# Patient Record
Sex: MALE | NOT HISPANIC OR LATINO | URBAN - METROPOLITAN AREA
[De-identification: names, ages, dates, MRNs, and addresses within clinical notes are randomized per-mention and may not be internally consistent; named-entity substitution may affect disease eponyms.]

---

## 2019-11-25 ENCOUNTER — TELEPHONE (OUTPATIENT)
Dept: GASTROENTEROLOGY | Facility: CLINIC | Age: 64
End: 2019-11-25

## 2020-01-20 NOTE — TELEPHONE ENCOUNTER
Dr Jaylen Parr has been contacted to schedule recall colon with no response-please advise   Thank you

## 2024-03-26 ENCOUNTER — PREP FOR PROCEDURE (OUTPATIENT)
Dept: GASTROENTEROLOGY | Facility: CLINIC | Age: 69
End: 2024-03-26

## 2024-03-26 ENCOUNTER — TELEPHONE (OUTPATIENT)
Dept: GASTROENTEROLOGY | Facility: CLINIC | Age: 69
End: 2024-03-26

## 2024-03-26 DIAGNOSIS — Z12.11 SCREENING FOR COLON CANCER: Primary | ICD-10-CM

## 2024-03-26 NOTE — TELEPHONE ENCOUNTER
Scheduled date of colonoscopy (as of today): 05/07/24  Physician performing colonoscopy: ESE  Location of colonoscopy: [facility]  Bowel prep reviewed with patient: M/D  Instructions reviewed with patient by: EMAIL  Clearances: NONE

## 2024-04-15 ENCOUNTER — TELEPHONE (OUTPATIENT)
Dept: GASTROENTEROLOGY | Facility: CLINIC | Age: 69
End: 2024-04-15

## 2024-04-15 DIAGNOSIS — Z12.11 SCREENING FOR COLON CANCER: Primary | ICD-10-CM

## 2024-04-15 NOTE — TELEPHONE ENCOUNTER
Per Sandra-pt carries [facility] insurance-resched procedure to [facility]

## 2024-04-15 NOTE — TELEPHONE ENCOUNTER
Left message for patient to call back and reschedule colonoscopy to [facility] due to having [facility] insurance.

## 2024-04-15 NOTE — TELEPHONE ENCOUNTER
Scheduled date of colonoscopy (as of today):04/30/2024  Physician performing colonoscopy:Dr Miller  Location of colonoscopy:[facility]  Bowel prep reviewed with patient:Miralax/dulcolax  Instructions emailed to patient by:alisha  Clearances: none

## 2024-04-15 NOTE — TELEPHONE ENCOUNTER
Spoke to patient. He asked that the precert department call his spouse Sybil ([phone] at work) to answer questions about insurance. He did not want to reschedule the procedure until after questions are addressed.

## 2024-11-25 ENCOUNTER — TELEPHONE (OUTPATIENT)
Age: 69
End: 2024-11-25

## 2024-11-25 NOTE — TELEPHONE ENCOUNTER
Patients GI provider:  Dr. GALLARDO    Number to return call: [phone]    Reason for call: PCP office calling asking for most recent colonoscopy report be sent to Fax# [phone]    Scheduled procedure/appointment date if applicable: 04/2024

## 2025-08-05 DIAGNOSIS — M79.604 PAIN IN RIGHT LEG: ICD-10-CM

## 2025-08-05 DIAGNOSIS — M79.605 PAIN IN LEFT LEG: ICD-10-CM
